# Patient Record
Sex: MALE | Race: WHITE | NOT HISPANIC OR LATINO | URBAN - METROPOLITAN AREA
[De-identification: names, ages, dates, MRNs, and addresses within clinical notes are randomized per-mention and may not be internally consistent; named-entity substitution may affect disease eponyms.]

---

## 2020-09-18 NOTE — PATIENT DISCUSSION
GLAUCOMA SUSPECT, OU :  POSITIVE FAMILY HISTORY- FATHER. IOP &amp; RNFL OCT WNL. RETURN FOR FOLLOW UP AS SCHEDULED.   (RNFL Q 2 yrs)

## 2022-10-27 ENCOUNTER — COMPREHENSIVE EXAM (OUTPATIENT)
Dept: URBAN - METROPOLITAN AREA CLINIC 1 | Facility: CLINIC | Age: 42
End: 2022-10-27

## 2022-10-27 DIAGNOSIS — H52.13: ICD-10-CM

## 2022-10-27 DIAGNOSIS — H16.143: ICD-10-CM

## 2022-10-27 DIAGNOSIS — H40.033: ICD-10-CM

## 2022-10-27 DIAGNOSIS — H04.123: ICD-10-CM

## 2022-10-27 PROCEDURE — 92015 DETERMINE REFRACTIVE STATE: CPT

## 2022-10-27 PROCEDURE — 92014 COMPRE OPH EXAM EST PT 1/>: CPT

## 2022-10-27 PROCEDURE — 92310 CONTACT LENS FITTING OU: CPT

## 2022-10-27 ASSESSMENT — VISUAL ACUITY
OD_CC: 20/20
OS_CC: 20/40
OD_CC: J3
OS_CC: J5

## 2022-10-27 ASSESSMENT — KERATOMETRY
OS_AXISANGLE_DEGREES: 40
OD_K1POWER_DIOPTERS: 45.75
OD_AXISANGLE2_DEGREES: 31
OD_AXISANGLE_DEGREES: 121
OS_K1POWER_DIOPTERS: 46.00
OS_AXISANGLE2_DEGREES: 130
OS_K2POWER_DIOPTERS: 46.25
OD_K2POWER_DIOPTERS: 46.00

## 2022-10-27 ASSESSMENT — TONOMETRY
OD_IOP_MMHG: 14
OS_IOP_MMHG: 14